# Patient Record
(demographics unavailable — no encounter records)

---

## 2025-06-03 NOTE — HISTORY OF PRESENT ILLNESS
[Right] : right hand dominant [FreeTextEntry1] : Patient here for initial evaluation of right third clicking locking for approximately 1 year.  Patient denies any trauma to the right hand.  Patient has tried rest and activity modifications with relief.  Patient has no previous injections for this chronic right third trigger finger.

## 2025-06-03 NOTE — CONSULT LETTER
[Dear  ___] : Dear  [unfilled], [Consult Letter:] : I had the pleasure of evaluating your patient, [unfilled]. [Please see my note below.] : Please see my note below. [Consult Closing:] : Thank you very much for allowing me to participate in the care of this patient.  If you have any questions, please do not hesitate to contact me. [Sincerely,] : Sincerely, [FreeTextEntry3] : David Mills M.D., FAAOS Co-Director [facility]

## 2025-06-03 NOTE — PHYSICAL EXAM
[de-identified] : Physical exam shows the patient to be alert and oriented x3, capable of ambulation. The patient is well-developed and well-nourished in no apparent respiratory distress. Majority of the skin is intact bilaterally in the upper extremities without lymphadenopathy at the elbows.  The wrists have a symmetric range of motion bilaterally. There is no tenderness over the scaphoid, scapholunate or lunotriquetral ligaments bilaterally. There is a negative Bolivar test bilaterally. There is negative tenderness over the radial ulnar joint or TFCC and no evidence of instability bilaterally. No tenderness over the pisotriquetral or hamate hook or CMC joint bilaterally. There is 5 over 5 strength of the wrists bilaterally.  There is positive swelling and tenderness localized to the A1 pulley of the right third digit with locking upon compression of the pulley.. There is no evidence of infection. No tenderness of the MP, PIP or DIP joint and no evidence of instability bilaterally. The FDS FDP and extensor mechanism is intact without instability and with 5 over 5 strength bilaterally in the digits.  There is good capillary refill of the digits bilaterally.There is no masses or sensitivity over the median and ulnar nerves at the level of the wrist. There is a negative Tinel's and negative Phalen's sign bilaterally. The sensation is grossly intact bilaterally. [de-identified] : PA and lateral of both hands shows degenerative changes involving the DIP joints but the radiocarpal midcarpal CMC and STT joints are well-preserved bilaterally.

## 2025-06-03 NOTE — ASSESSMENT
[FreeTextEntry1] : Chronic 1 year history of a right third trigger finger which did not resolve despite rest and activity modifications.  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    The risks benefits and alternatives of treatment were discussed ranging from conservative to aggressive forms of treatment.  This included (but was not limited to) pain, infection, subcutaneous atrophy, skin depigmentation, tendon rupture, recurrence, incomplete relief of symptoms, tissue loss etc...  They agreed to undergo the steroid injection.   Under informed consent and sterile conditions, 1/2 cc of 2% plain lidocaine and 1/2 cc of Kenalog 10 was precisely injected into the patient's right third tendon sheath.   A sterile Band-Aid was placed, and a home program was elected over occupational therapy.    It is my hope that this significantly alleviates the patient's symptoms. If symptoms are not fully resolved in the next 4-6 weeks they were encouraged to return to the office for reevaluation. Patient may also consider other forms of conservative care or surgical decompression which was discussed in the office today.   If symptoms are resolved, they can follow up on a as-needed basis.